# Patient Record
Sex: MALE | Race: BLACK OR AFRICAN AMERICAN | NOT HISPANIC OR LATINO | Employment: OTHER | URBAN - METROPOLITAN AREA
[De-identification: names, ages, dates, MRNs, and addresses within clinical notes are randomized per-mention and may not be internally consistent; named-entity substitution may affect disease eponyms.]

---

## 2017-11-09 ENCOUNTER — HOSPITAL ENCOUNTER (INPATIENT)
Facility: HOSPITAL | Age: 82
LOS: 3 days | Discharge: HOME | DRG: 988 | End: 2017-11-13
Attending: EMERGENCY MEDICINE
Payer: MEDICARE

## 2017-11-09 DIAGNOSIS — L03.031 CELLULITIS OF RIGHT TOE: Primary | ICD-10-CM

## 2017-11-09 DIAGNOSIS — Z89.619: ICD-10-CM

## 2017-11-09 DIAGNOSIS — I10 HYPERTENSION, UNSPECIFIED TYPE: ICD-10-CM

## 2017-11-09 DIAGNOSIS — I73.9 PERIPHERAL ARTERIAL DISEASE: ICD-10-CM

## 2017-11-09 DIAGNOSIS — M86.9 OSTEOMYELITIS: ICD-10-CM

## 2017-11-09 DIAGNOSIS — E11.22 CONTROLLED TYPE 2 DIABETES MELLITUS WITH STAGE 3 CHRONIC KIDNEY DISEASE, UNSPECIFIED LONG TERM INSULIN USE STATUS: ICD-10-CM

## 2017-11-09 DIAGNOSIS — N18.3 CONTROLLED TYPE 2 DIABETES MELLITUS WITH STAGE 3 CHRONIC KIDNEY DISEASE, UNSPECIFIED LONG TERM INSULIN USE STATUS: ICD-10-CM

## 2017-11-09 DIAGNOSIS — K21.9 GASTROESOPHAGEAL REFLUX DISEASE, ESOPHAGITIS PRESENCE NOT SPECIFIED: ICD-10-CM

## 2017-11-09 LAB
ALBUMIN SERPL BCP-MCNC: 2.9 G/DL
ALP SERPL-CCNC: 105 U/L
ALT SERPL W/O P-5'-P-CCNC: 8 U/L
ANION GAP SERPL CALC-SCNC: 10 MMOL/L
AST SERPL-CCNC: 12 U/L
BASOPHILS # BLD AUTO: 0 K/UL
BASOPHILS NFR BLD: 0.8 %
BILIRUB SERPL-MCNC: 0.2 MG/DL
BUN SERPL-MCNC: 23 MG/DL
CALCIUM SERPL-MCNC: 8.9 MG/DL
CHLORIDE SERPL-SCNC: 101 MMOL/L
CO2 SERPL-SCNC: 26 MMOL/L
CREAT SERPL-MCNC: 1.5 MG/DL
CRP SERPL-MCNC: 18.5 MG/L
DIFFERENTIAL METHOD: ABNORMAL
EOSINOPHIL # BLD AUTO: 0.1 K/UL
EOSINOPHIL NFR BLD: 2.3 %
ERYTHROCYTE [DISTWIDTH] IN BLOOD BY AUTOMATED COUNT: 14.6 %
ERYTHROCYTE [SEDIMENTATION RATE] IN BLOOD BY WESTERGREN METHOD: 55 MM/HR
EST. GFR  (AFRICAN AMERICAN): 46 ML/MIN/1.73 M^2
EST. GFR  (NON AFRICAN AMERICAN): 40 ML/MIN/1.73 M^2
GLUCOSE SERPL-MCNC: 115 MG/DL
HCT VFR BLD AUTO: 24.7 %
HGB BLD-MCNC: 8.1 G/DL
LYMPHOCYTES # BLD AUTO: 0.8 K/UL
LYMPHOCYTES NFR BLD: 17.1 %
MCH RBC QN AUTO: 27.5 PG
MCHC RBC AUTO-ENTMCNC: 32.6 G/DL
MCV RBC AUTO: 84 FL
MONOCYTES # BLD AUTO: 0.4 K/UL
MONOCYTES NFR BLD: 8.9 %
NEUTROPHILS # BLD AUTO: 3.5 K/UL
NEUTROPHILS NFR BLD: 70.9 %
PLATELET # BLD AUTO: 220 K/UL
PMV BLD AUTO: 8.9 FL
POTASSIUM SERPL-SCNC: 3.8 MMOL/L
PROT SERPL-MCNC: 7.1 G/DL
RBC # BLD AUTO: 2.93 M/UL
SODIUM SERPL-SCNC: 137 MMOL/L
WBC # BLD AUTO: 4.9 K/UL

## 2017-11-09 PROCEDURE — G0378 HOSPITAL OBSERVATION PER HR: HCPCS

## 2017-11-09 PROCEDURE — 99284 EMERGENCY DEPT VISIT MOD MDM: CPT | Mod: 25

## 2017-11-09 PROCEDURE — 12000002 HC ACUTE/MED SURGE SEMI-PRIVATE ROOM

## 2017-11-09 PROCEDURE — 96367 TX/PROPH/DG ADDL SEQ IV INF: CPT

## 2017-11-09 PROCEDURE — 25000003 PHARM REV CODE 250: Performed by: EMERGENCY MEDICINE

## 2017-11-09 PROCEDURE — 85651 RBC SED RATE NONAUTOMATED: CPT

## 2017-11-09 PROCEDURE — 86140 C-REACTIVE PROTEIN: CPT

## 2017-11-09 PROCEDURE — 63600175 PHARM REV CODE 636 W HCPCS: Performed by: INTERNAL MEDICINE

## 2017-11-09 PROCEDURE — 36415 COLL VENOUS BLD VENIPUNCTURE: CPT

## 2017-11-09 PROCEDURE — 80053 COMPREHEN METABOLIC PANEL: CPT

## 2017-11-09 PROCEDURE — 85025 COMPLETE CBC W/AUTO DIFF WBC: CPT

## 2017-11-09 PROCEDURE — 99222 1ST HOSP IP/OBS MODERATE 55: CPT | Mod: ,,, | Performed by: INTERNAL MEDICINE

## 2017-11-09 PROCEDURE — 96365 THER/PROPH/DIAG IV INF INIT: CPT

## 2017-11-09 PROCEDURE — 63600175 PHARM REV CODE 636 W HCPCS: Performed by: EMERGENCY MEDICINE

## 2017-11-09 RX ORDER — HYDROCHLOROTHIAZIDE 50 MG/1
25 TABLET ORAL DAILY
COMMUNITY

## 2017-11-09 RX ORDER — PANTOPRAZOLE SODIUM 40 MG/1
40 TABLET, DELAYED RELEASE ORAL DAILY
Status: DISCONTINUED | OUTPATIENT
Start: 2017-11-10 | End: 2017-11-13 | Stop reason: HOSPADM

## 2017-11-09 RX ORDER — ATORVASTATIN CALCIUM 40 MG/1
20 TABLET, FILM COATED ORAL DAILY
COMMUNITY

## 2017-11-09 RX ORDER — ATORVASTATIN CALCIUM 20 MG/1
20 TABLET, FILM COATED ORAL DAILY
Status: DISCONTINUED | OUTPATIENT
Start: 2017-11-10 | End: 2017-11-13 | Stop reason: HOSPADM

## 2017-11-09 RX ORDER — ONDANSETRON 2 MG/ML
4 INJECTION INTRAMUSCULAR; INTRAVENOUS EVERY 8 HOURS PRN
Status: DISCONTINUED | OUTPATIENT
Start: 2017-11-09 | End: 2017-11-13 | Stop reason: HOSPADM

## 2017-11-09 RX ORDER — OMEPRAZOLE 20 MG/1
20 TABLET, DELAYED RELEASE ORAL DAILY
COMMUNITY

## 2017-11-09 RX ORDER — DOCUSATE SODIUM 100 MG/1
100 CAPSULE, LIQUID FILLED ORAL NIGHTLY PRN
COMMUNITY

## 2017-11-09 RX ORDER — HYDROCHLOROTHIAZIDE 25 MG/1
25 TABLET ORAL DAILY
Status: DISCONTINUED | OUTPATIENT
Start: 2017-11-10 | End: 2017-11-13 | Stop reason: HOSPADM

## 2017-11-09 RX ORDER — LISINOPRIL 40 MG/1
40 TABLET ORAL DAILY
Status: DISCONTINUED | OUTPATIENT
Start: 2017-11-10 | End: 2017-11-13 | Stop reason: HOSPADM

## 2017-11-09 RX ORDER — ACETAMINOPHEN 325 MG/1
650 TABLET ORAL EVERY 6 HOURS PRN
Status: DISCONTINUED | OUTPATIENT
Start: 2017-11-09 | End: 2017-11-13 | Stop reason: HOSPADM

## 2017-11-09 RX ORDER — AMOXICILLIN 250 MG
1 CAPSULE ORAL DAILY PRN
Status: DISCONTINUED | OUTPATIENT
Start: 2017-11-09 | End: 2017-11-09 | Stop reason: SDUPTHER

## 2017-11-09 RX ORDER — HEPARIN SODIUM 5000 [USP'U]/ML
5000 INJECTION, SOLUTION INTRAVENOUS; SUBCUTANEOUS EVERY 12 HOURS
Status: DISCONTINUED | OUTPATIENT
Start: 2017-11-09 | End: 2017-11-13 | Stop reason: HOSPADM

## 2017-11-09 RX ORDER — ASPIRIN 81 MG/1
81 TABLET ORAL DAILY
Status: DISCONTINUED | OUTPATIENT
Start: 2017-11-10 | End: 2017-11-13 | Stop reason: HOSPADM

## 2017-11-09 RX ORDER — DOCUSATE SODIUM 100 MG/1
100 CAPSULE, LIQUID FILLED ORAL NIGHTLY PRN
Status: DISCONTINUED | OUTPATIENT
Start: 2017-11-09 | End: 2017-11-13 | Stop reason: HOSPADM

## 2017-11-09 RX ADMIN — PIPERACILLIN SODIUM,TAZOBACTAM SODIUM 4.5 G: 4; .5 INJECTION, POWDER, FOR SOLUTION INTRAVENOUS at 06:11

## 2017-11-09 RX ADMIN — HEPARIN SODIUM 5000 UNITS: 5000 INJECTION, SOLUTION INTRAVENOUS; SUBCUTANEOUS at 10:11

## 2017-11-09 RX ADMIN — VANCOMYCIN HYDROCHLORIDE 1000 MG: 1 INJECTION, POWDER, LYOPHILIZED, FOR SOLUTION INTRAVENOUS at 07:11

## 2017-11-09 NOTE — ED NOTES
Pt wheeled to room 8 via w/c with family. [facility] infected [facility] great [facility]. Pt denies any other pain or complaints. States toe has been hurting for about 4 weeks. Right great toe swollen, painful with pus drainage noted. Family at bedside. Resp even and unlabored. Pt has previous left aka.

## 2017-11-09 NOTE — ED PROVIDER NOTES
"Encounter Date: 11/9/2017    SCRIBE #1 NOTE: I, Domonique Alejandre, am scribing for, and in the presence of,  Dr. Robin. I have scribed the entire note.       History     Chief Complaint   Patient presents with    Wound Infection       11/09/2017 4:09 PM     Chief complaint: Possible right big toe infection       Michael Shin is a 89 y.o. male with a history of stroke, HTN, CAD, chronic kidney disease, glaucoma, prostate cancer, anemia, and hearing loss who presents to the ED with concern for a possible infection on his right big toe.  Earlier today, the patient was at the [facility] where he was told to come to the ED because "they think his toe might be infected." Patient reports that his "toe has been bugging me on and off for 3-4 weeks." He denies having a fever or vomiting. The patient does not endorse having a history of DM. He has not taken any antibiotics for his toe. His PSHx includes a left AKA after a foot infection, coronary angioplasty, and cataract extraction with intraocular lens implant.       The history is provided by the patient and a relative.     Review of patient's allergies indicates:  No Known Allergies  Past Medical History:   Diagnosis Date    Anemia     CAD (coronary artery disease) 6/21/2013    Cataract     os    CKD (chronic kidney disease) stage 3, GFR 30-59 ml/min 3/27/2014    Glaucoma (increased eye pressure) 6/17/2013    HEARING LOSS     Hypertension     Prostate cancer     Stroke      Past Surgical History:   Procedure Laterality Date    CATARACT EXTRACTION W/  INTRAOCULAR LENS IMPLANT  2006    os    CORONARY ANGIOPLASTY  1993    LEG AMPUTATION THROUGH KNEE       Family History   Problem Relation Age of Onset    Stroke Father      Social History   Substance Use Topics    Smoking status: Former Smoker     Packs/day: 1.00     Years: 50.00     Types: Cigarettes, Cigars     Quit date: 2/6/1980    Smokeless tobacco: Never Used    Alcohol use No     Review of Systems "   Constitutional: Negative for fever.   Musculoskeletal: Positive for arthralgias. Negative for back pain.   Skin: Positive for wound.        r toe       Physical Exam     Initial Vitals [11/09/17 1601]   BP Pulse Resp Temp SpO2   (!) 165/93 77 16 97.9 °F (36.6 °C) 100 %      MAP       117         Physical Exam    Nursing note and vitals reviewed.  Constitutional: He appears well-developed and well-nourished. He is not diaphoretic.  Non-toxic appearance. He does not have a sickly appearance. He does not appear ill. No distress.   HENT:   Head: Normocephalic and atraumatic.   Eyes: EOM are normal.   Neck: Normal range of motion. Neck supple. Normal range of motion present. No neck rigidity.   Cardiovascular: Normal rate, regular rhythm and intact distal pulses. Exam reveals no gallop and no friction rub.    Pulmonary/Chest: Breath sounds normal. No respiratory distress. He has no wheezes. He has no rhonchi. He has no rales.   Musculoskeletal: Normal range of motion.        Right foot: Right great toe: Exhibits tenderness (Lateral nail fold of right big toe; superior drainage with erythema and tenderness).   Left AKA.    Neurological: He is alert and oriented to person, place, and time.   Skin: Skin is warm and dry. No rash noted.   Psychiatric: He has a normal mood and affect. His behavior is normal. Judgment and thought content normal.         ED Course   Procedures  Labs Reviewed   SEDIMENTATION RATE, MANUAL   C-REACTIVE PROTEIN        Imaging Results          X-Ray Toe 2 or More Views Right (In process)  Result time 11/09/17 16:36:14                   Medical Decision Making:   History:   Old Medical Records: I decided to obtain old medical records.  Clinical Tests:   Lab Tests: Reviewed and Ordered  Radiological Study: Reviewed and Ordered            Scribe Attestation:   Scribe #1: I performed the above scribed service and the documentation accurately describes the services I performed. I attest to the accuracy  of the note.    I, Dr. Robin, personally performed the services described in this documentation. All medical record entries made by the scribe were at my direction and in my presence.  I have reviewed the chart and agree that the record reflects my personal performance and is accurate and complete.6:26 PM 11/09/2017            ED Course as of Nov 09 1824   Thu Nov 09, 2017   1815 89-year-old male sent to the emergency room to rule out toe osteomyelitis.  Sedimentation rate elevated at 55.  X-ray does not demonstrate any osteomyelitis but it is possible that it is early.  Case discussed with Dr. Chacon of \[facility]\"" medicine who will admit the patient.  IV Zosyn and IV vancomycin will be given in the emergency room.  Otherwise well-appearing with no sign of sepsis.  [EF]      ED Course User Index  [EF] Joe Robin MD     Clinical Impression:   No diagnosis found.                                     Joe Robin MD  11/09/17 1822

## 2017-11-10 ENCOUNTER — ANESTHESIA EVENT (OUTPATIENT)
Dept: SURGERY | Facility: HOSPITAL | Age: 82
DRG: 988 | End: 2017-11-10
Payer: MEDICARE

## 2017-11-10 ENCOUNTER — ANESTHESIA (OUTPATIENT)
Dept: SURGERY | Facility: HOSPITAL | Age: 82
DRG: 988 | End: 2017-11-10
Payer: MEDICARE

## 2017-11-10 PROBLEM — E44.0 MALNUTRITION OF MODERATE DEGREE: Status: ACTIVE | Noted: 2017-11-10

## 2017-11-10 LAB
ANION GAP SERPL CALC-SCNC: 9 MMOL/L
BASOPHILS # BLD AUTO: 0.1 K/UL
BASOPHILS NFR BLD: 1.2 %
BUN SERPL-MCNC: 22 MG/DL
CALCIUM SERPL-MCNC: 9.3 MG/DL
CHLORIDE SERPL-SCNC: 101 MMOL/L
CO2 SERPL-SCNC: 28 MMOL/L
CREAT SERPL-MCNC: 1.6 MG/DL
DIFFERENTIAL METHOD: ABNORMAL
EOSINOPHIL # BLD AUTO: 0.3 K/UL
EOSINOPHIL NFR BLD: 5.3 %
ERYTHROCYTE [DISTWIDTH] IN BLOOD BY AUTOMATED COUNT: 14.6 %
EST. GFR  (AFRICAN AMERICAN): 43 ML/MIN/1.73 M^2
EST. GFR  (NON AFRICAN AMERICAN): 37 ML/MIN/1.73 M^2
FOLATE SERPL-MCNC: 13.7 NG/ML
GLUCOSE SERPL-MCNC: 105 MG/DL
HCT VFR BLD AUTO: 25.3 %
HGB BLD-MCNC: 8.2 G/DL
IRON SERPL-MCNC: 29 UG/DL
LYMPHOCYTES # BLD AUTO: 1.2 K/UL
LYMPHOCYTES NFR BLD: 22.4 %
MCH RBC QN AUTO: 27.5 PG
MCHC RBC AUTO-ENTMCNC: 32.5 G/DL
MCV RBC AUTO: 84 FL
MONOCYTES # BLD AUTO: 0.4 K/UL
MONOCYTES NFR BLD: 7.5 %
NEUTROPHILS # BLD AUTO: 3.3 K/UL
NEUTROPHILS NFR BLD: 63.6 %
PLATELET # BLD AUTO: 181 K/UL
PMV BLD AUTO: 9.8 FL
POTASSIUM SERPL-SCNC: 4.2 MMOL/L
RBC # BLD AUTO: 3 M/UL
SATURATED IRON: 15 %
SODIUM SERPL-SCNC: 138 MMOL/L
TOTAL IRON BINDING CAPACITY: 188 UG/DL
TRANSFERRIN SERPL-MCNC: 127 MG/DL
VIT B12 SERPL-MCNC: 206 PG/ML
WBC # BLD AUTO: 5.2 K/UL

## 2017-11-10 PROCEDURE — 25000003 PHARM REV CODE 250: Performed by: PODIATRIST

## 2017-11-10 PROCEDURE — 37000009 HC ANESTHESIA EA ADD 15 MINS: Performed by: PODIATRIST

## 2017-11-10 PROCEDURE — 25000003 PHARM REV CODE 250: Performed by: EMERGENCY MEDICINE

## 2017-11-10 PROCEDURE — 82607 VITAMIN B-12: CPT

## 2017-11-10 PROCEDURE — 63600175 PHARM REV CODE 636 W HCPCS: Performed by: INTERNAL MEDICINE

## 2017-11-10 PROCEDURE — D9220A PRA ANESTHESIA: Mod: CRNA,,, | Performed by: NURSE ANESTHETIST, CERTIFIED REGISTERED

## 2017-11-10 PROCEDURE — 97802 MEDICAL NUTRITION INDIV IN: CPT

## 2017-11-10 PROCEDURE — 85025 COMPLETE CBC W/AUTO DIFF WBC: CPT

## 2017-11-10 PROCEDURE — 82746 ASSAY OF FOLIC ACID SERUM: CPT

## 2017-11-10 PROCEDURE — 36415 COLL VENOUS BLD VENIPUNCTURE: CPT

## 2017-11-10 PROCEDURE — 0HBRXZZ EXCISION OF TOE NAIL, EXTERNAL APPROACH: ICD-10-PCS | Performed by: PODIATRIST

## 2017-11-10 PROCEDURE — G0378 HOSPITAL OBSERVATION PER HR: HCPCS

## 2017-11-10 PROCEDURE — 87070 CULTURE OTHR SPECIMN AEROBIC: CPT

## 2017-11-10 PROCEDURE — 83540 ASSAY OF IRON: CPT

## 2017-11-10 PROCEDURE — 87015 SPECIMEN INFECT AGNT CONCNTJ: CPT | Mod: 59

## 2017-11-10 PROCEDURE — 71000033 HC RECOVERY, INTIAL HOUR: Performed by: PODIATRIST

## 2017-11-10 PROCEDURE — 99232 SBSQ HOSP IP/OBS MODERATE 35: CPT | Mod: ,,, | Performed by: INTERNAL MEDICINE

## 2017-11-10 PROCEDURE — 99900103 DSU ONLY-NO CHARGE-INITIAL HR (STAT): Performed by: PODIATRIST

## 2017-11-10 PROCEDURE — 36000706: Performed by: PODIATRIST

## 2017-11-10 PROCEDURE — 25000003 PHARM REV CODE 250: Performed by: NURSE ANESTHETIST, CERTIFIED REGISTERED

## 2017-11-10 PROCEDURE — 87075 CULTR BACTERIA EXCEPT BLOOD: CPT

## 2017-11-10 PROCEDURE — 37000008 HC ANESTHESIA 1ST 15 MINUTES: Performed by: PODIATRIST

## 2017-11-10 PROCEDURE — D9220A PRA ANESTHESIA: Mod: ANES,,, | Performed by: ANESTHESIOLOGY

## 2017-11-10 PROCEDURE — 12000002 HC ACUTE/MED SURGE SEMI-PRIVATE ROOM

## 2017-11-10 PROCEDURE — 0QBQ3ZX EXCISION OF RIGHT TOE PHALANX, PERCUTANEOUS APPROACH, DIAGNOSTIC: ICD-10-PCS | Performed by: PODIATRIST

## 2017-11-10 PROCEDURE — 80048 BASIC METABOLIC PNL TOTAL CA: CPT

## 2017-11-10 PROCEDURE — 25000003 PHARM REV CODE 250: Performed by: INTERNAL MEDICINE

## 2017-11-10 PROCEDURE — 36000707: Performed by: PODIATRIST

## 2017-11-10 PROCEDURE — 87116 MYCOBACTERIA CULTURE: CPT

## 2017-11-10 RX ORDER — SODIUM CHLORIDE, SODIUM LACTATE, POTASSIUM CHLORIDE, CALCIUM CHLORIDE 600; 310; 30; 20 MG/100ML; MG/100ML; MG/100ML; MG/100ML
INJECTION, SOLUTION INTRAVENOUS CONTINUOUS PRN
Status: DISCONTINUED | OUTPATIENT
Start: 2017-11-10 | End: 2017-11-10

## 2017-11-10 RX ORDER — SODIUM CHLORIDE, SODIUM LACTATE, POTASSIUM CHLORIDE, CALCIUM CHLORIDE 600; 310; 30; 20 MG/100ML; MG/100ML; MG/100ML; MG/100ML
1000 INJECTION, SOLUTION INTRAVENOUS ONCE
Status: DISCONTINUED | OUTPATIENT
Start: 2017-11-10 | End: 2017-11-11

## 2017-11-10 RX ORDER — FENTANYL CITRATE 50 UG/ML
25 INJECTION, SOLUTION INTRAMUSCULAR; INTRAVENOUS EVERY 5 MIN PRN
Status: DISCONTINUED | OUTPATIENT
Start: 2017-11-10 | End: 2017-11-13 | Stop reason: HOSPADM

## 2017-11-10 RX ORDER — LIDOCAINE HYDROCHLORIDE 10 MG/ML
INJECTION, SOLUTION EPIDURAL; INFILTRATION; INTRACAUDAL; PERINEURAL
Status: DISCONTINUED | OUTPATIENT
Start: 2017-11-10 | End: 2017-11-10 | Stop reason: HOSPADM

## 2017-11-10 RX ADMIN — LISINOPRIL 40 MG: 40 TABLET ORAL at 09:11

## 2017-11-10 RX ADMIN — PIPERACILLIN AND TAZOBACTAM 4.5 G: 4; .5 INJECTION, POWDER, LYOPHILIZED, FOR SOLUTION INTRAVENOUS; PARENTERAL at 11:11

## 2017-11-10 RX ADMIN — HEPARIN SODIUM 5000 UNITS: 5000 INJECTION, SOLUTION INTRAVENOUS; SUBCUTANEOUS at 09:11

## 2017-11-10 RX ADMIN — ASPIRIN 81 MG: 81 TABLET, COATED ORAL at 09:11

## 2017-11-10 RX ADMIN — HYDROCHLOROTHIAZIDE 25 MG: 25 TABLET ORAL at 09:11

## 2017-11-10 RX ADMIN — SODIUM CHLORIDE, SODIUM LACTATE, POTASSIUM CHLORIDE, AND CALCIUM CHLORIDE: .6; .31; .03; .02 INJECTION, SOLUTION INTRAVENOUS at 06:11

## 2017-11-10 RX ADMIN — PIPERACILLIN AND TAZOBACTAM 4.5 G: 4; .5 INJECTION, POWDER, LYOPHILIZED, FOR SOLUTION INTRAVENOUS; PARENTERAL at 03:11

## 2017-11-10 RX ADMIN — PANTOPRAZOLE SODIUM 40 MG: 40 TABLET, DELAYED RELEASE ORAL at 09:11

## 2017-11-10 RX ADMIN — ATORVASTATIN CALCIUM 20 MG: 20 TABLET, FILM COATED ORAL at 09:11

## 2017-11-10 RX ADMIN — PIPERACILLIN SODIUM AND TAZOBACTAM SODIUM 4.5 G: 3; .375 INJECTION, POWDER, LYOPHILIZED, FOR SOLUTION INTRAVENOUS at 06:11

## 2017-11-10 NOTE — PLAN OF CARE
Problem: Patient Care Overview  Goal: Plan of Care Review  Outcome: Ongoing (interventions implemented as appropriate)  Pt free of injury, able to call for assistance when needed, vital signs stable. Iv antibiotics given per orders. No complaints of pain. Podiatry and Infectious disease consults ordered. Vital signs stable. Will continue to monitor pt.

## 2017-11-10 NOTE — PLAN OF CARE
Problem: Patient Care Overview  Goal: Plan of Care Review  Plan of care reviewed with pt during admission.  Pt verbalized understanding. Hourly/ Q2 hourly rounds completed on this pt throughout shift.  Pt denied need for pain medication throughout shift, wears brief but has been using urinal to void as usual. Repositions self safety maintained.  Patient has remained free from fall/injury, no skin breakdown noted.  Side rails up x2, bed in locked and lowest position, call light kept within reach.  Needs attended to, will continue to monitor/ update as indicated

## 2017-11-10 NOTE — PLAN OF CARE
1743- pt rransferrred from room 303B via bed, pt had left AKA. 20 jelco in left AC fluids running at KVO, pt in diapers. [place], blind in left eye. Answers questions approp. Dr burleson at bedsided got anes. Consent signed. Waiting for dr orona to come see pt.

## 2017-11-10 NOTE — PLAN OF CARE
The pt lives at home with his daughter, Tanya Shin [phone]. [facility]. The pt stated that he lives at home with his daughter and uses a wheelchair. He denies coumadin or dialysis. He denies HH . He has [employer] and [facility] insurance. No questions or concerns at this time. Nora Hernández LMSW      11/10/17 0939   Discharge Assessment   Assessment Type Discharge Planning Assessment   Confirmed/corrected address and phone number on facesheet? Yes   Assessment information obtained from? Patient   Communicated expected length of stay with patient/caregiver no   Prior to hospitilization cognitive status: Alert/Oriented   Prior to hospitalization functional status: Assistive Equipment   Current cognitive status: Alert/Oriented   Current Functional Status: Assistive Equipment   Lives With child(zena), adult  (Tanya Shin [phone])   Able to Return to Prior Arrangements yes   Is patient able to care for self after discharge? Unable to determine at this time (comments)   Readmission Within The Last 30 Days no previous admission in last 30 days   Patient currently being followed by outpatient case management? No   Patient currently receives any other outside agency services? No   Equipment Currently Used at Home cane, quad;wheelchair;shower chair   Do you have any problems affording any of your prescribed medications? No   Is the patient taking medications as prescribed? yes   Does the patient have transportation home? Yes   Transportation Available family or friend will provide   Does the patient receive services at the Coumadin Clinic? No   Discharge Plan A Home   Discharge Plan B Home with family   Patient/Family In Agreement With Plan yes

## 2017-11-10 NOTE — PROGRESS NOTES
"Progress Note  Hospital Medicine  Patient Name:Michael Shin  MRN:  [id number]  Patient Class: IP- Inpatient  Admit Date: 11/9/2017  Length of Stay: 1 days  Expected Discharge Date:   Attending Physician: Russell Chacon MD  Primary Care Provider:  Primary Doctor No    SUBJECTIVE:     Principal Problem: Right great toe infection  Initial history of present illness: Patient is a 89 y.o. male admitted to Hospitalist Service from [facility] Room with complaint of right great toe infection. Patient reportedly has past medical history significant for chronic anemia, CKD -3, hypertension, history or prostate cancer, PAD s/p left AKA and CAD. Patient was sent from [facility] for concern for right great toe infection. Patient is a poor historian. Patient reports that his "toe has been bugging me on and off for 3-4 weeks." He denies having a fever or vomiting. The patient does not endorse having a history of DM. He has not taken any antibiotics for his toe. Patient denied chest pain, shortness of breath, abdominal pain, nausea, vomiting, headache, vision changes, focal neuro-deficits, cough or fever.    PMH/PSH/SH/FH/Meds: reviewed.    Symptoms/Review of Systems:  Patient reports continued pain to foot. No shortness of breath, cough, chest pain or headache, fever or abdominal pain.     Diet:  Adequate intake.    Activity level: Normal.    Pain:  Patient reports no pain.       OBJECTIVE:   Vital Signs (Most Recent):      Temp: 99 °F (37.2 °C) (11/10/17 0445)  Pulse: 63 (11/10/17 0445)  Resp: 18 (11/10/17 0445)  BP: (!) 145/63 (11/10/17 0445)  SpO2: 100 % (11/10/17 0445)       Vital Signs Range (Last 24H):  Temp:  [97.9 °F (36.6 °C)-99 °F (37.2 °C)]   Pulse:  [61-77]   Resp:  [16-20]   BP: (145-182)/(63-93)   SpO2:  [91 %-100 %]     Weight: 48.5 kg (107 lb)  Body mass index is 17.27 kg/m².    Intake/Output Summary (Last 24 hours) at 11/10/17 2084  Last data filed at 11/10/17 0600   Gross per 24 hour "   Intake              100 ml   Output              700 ml   Net             -600 ml     Physical Examination:  General appearance: well developed, appears stated age  Head: normocephalic, atraumatic  Eyes:  conjunctivae/corneas clear. PERRL.  Nose: Nares normal. Septum midline.  Throat: lips, mucosa, and tongue normal; teeth and gums normal, no throat erythema.  Neck: supple, symmetrical, trachea midline, no JVD and thyroid not enlarged, symmetric, no tenderness/mass/nodules  Lungs:  clear to auscultation bilaterally and normal respiratory effort  Chest wall: no tenderness  Heart: regular rate and rhythm, S1, S2 normal, no murmur, click, rub or gallop  Abdomen: soft, non-tender non-distented; bowel sounds normal; no masses,  no organomegaly  Extremities: no cyanosis, clubbing or edema. Right great toe is warm, tender and slightly swollen . Lateral nail fold of right big toe; superior drainage with erythema and tenderness. Left AKA.    Pulses: 2+ and symmetric  Skin: Skin color, texture, turgor normal. No rashes or lesions.  Lymph nodes: Cervical, supraclavicular, and axillary nodes normal.  Neurologic: Normal strength and tone. No focal numbness or weakness. CNII-XII intact.      CBC:    Recent Labs  Lab 11/09/17  1828 11/10/17  0517   WBC 4.90 5.20   RBC 2.93* 3.00*   HGB 8.1* 8.2*   HCT 24.7* 25.3*    181   MCV 84 84   MCH 27.5 27.5   MCHC 32.6 32.5   BMP    Recent Labs  Lab 11/09/17  1828 11/10/17  0517   * 105    138   K 3.8 4.2    101   CO2 26 28   BUN 23 22   CREATININE 1.5* 1.6*   CALCIUM 8.9 9.3      Diagnostic Results:  Microbiology Results (last 7 days)     ** No results found for the last 168 hours. 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 Osteomyelitis or fracture is not identified.    MRI right foot:  1.  Features of acute osteomyelitis involving the distal end of the  right first distal phalanx.    2. Soft tissue edema elsewhere in the right forefoot most notably involving the right great toe, consistent with cellulitis and/or dependent edema. No focal fluid collection to suggest abscess is identified.  Assessment/Plan:      Cellulitis of right toe osteomyelitis [L03.031]  Keep extremity elevated.   Continue IV Clindamycin and Vancomycin. Vancomycin doing per pharmacy.  MRI of right foot reviewed.  CRP and ESR reviewed.  Consult podiatrist,. Check US LE arterial.      Yes    CKD (chronic kidney disease) stage 3, GFR 30-59 ml/min [N18.3]  Monitor BUN/SCr.  Monitor I/Os.  Monitor electrolytes.   Yes    Status post unilateral above knee amputation [Z89.619]  Supportive care.      Not Applicable    Type 2 diabetes mellitus with renal manifestations, controlled [E11.29]  Check blood glucose level q AC/HS.  Use Novolog Insulin Sliding Scale as needed.   Continue American Diabetic Association 1800 Kcal diet.   Yes    GERD (gastroesophageal reflux disease) [K21.9]  Continue PPI.      Yes    Hypertension [I10]  Chronic problem. Will continue chronic medications and monitor for any changes, adjusting as needed.   Yes    Peripheral arterial disease [I73.9]  Chronic problem. Will continue chronic medications and monitor for any changes, adjusting as needed.   Yes       DVT prophylaxis: Heparin 5K sq q 12 hrs.      VTE Risk Mitigation         Ordered     heparin (porcine) injection 5,000 Units  Every 12 hours     Route:  Subcutaneous        11/09/17 1955     Medium Risk of VTE  Once      11/09/17 1955     Place sequential compression device  Until discontinued      11/09/17 1955     Place JULIAN hose  Until discontinued      11/09/17 1955        Russell Chacon MD  Department of Hospital Medicine   [facility]

## 2017-11-10 NOTE — ASSESSMENT & PLAN NOTE
Related to (etiology):   Chronic illness    Signs and Symptoms (as evidenced by):   1) Underweight with adjusted BMI of 19 for AKA. 2) wound on rt great toe    Interventions/Recommendations (treatment strategy):  Continue low sodium diet and add ONS    Nutrition Diagnosis Status:   New

## 2017-11-10 NOTE — ASSESSMENT & PLAN NOTE
Related to (etiology):   Chronic illness    Signs and Symptoms (as evidenced by):   1) Underweight with adjusted BMI of 19 for AKA. 2) wound on rt great toe 3) Muscle loss with protruding clavicle 4) Fat loss noted with hollow bilateral orbital    Interventions/Recommendations (treatment strategy):  Continue low sodium diet and add ONS    Nutrition Diagnosis Status:   New

## 2017-11-10 NOTE — PROGRESS NOTES
[facility]  Adult Nutrition  Progress Note    SUMMARY     Recommendations    Recommendation/Intervention: 1) Continue low sodium diet 2) Add Boost Plus tid  Goals: 1) Meal intake at least 50% 2) Use of ONS during admit  Nutrition Goal Status: new  Communication of RD Recs:  (care plan, second signl)    Discharge Plan     low sodium diet    Reason for Assessment    Reason for Assessment: length of stay      1. Cellulitis of right toe    2. Gastroesophageal reflux disease, esophagitis presence not specified    3. Hypertension, unspecified type    4. Status post unilateral above knee amputation    5. Controlled type 2 diabetes mellitus with stage 3 chronic kidney disease, unspecified long term insulin use status      Past Medical History:   Diagnosis Date    Anemia     CAD (coronary artery disease) 6/21/2013    Cataract     os    CKD (chronic kidney disease) stage 3, GFR 30-59 ml/min 3/27/2014    Glaucoma (increased eye pressure) 6/17/2013    HEARING LOSS     Hypertension     Prostate cancer     Stroke       Interdisciplinary Rounds: did not attend     General Information Comments: [facility] Pt is [place]. Pt reports good appetite with meal intake of 100%; however, nursing records show 25% intake. Pt appears underweight, cachetic. Pt unable to say type of home diet he follows and notes that his daughter cooks for him.   (Pt reports UBW of 145 lbs prior to amputation leg. )    Nutrition Prescription Ordered    Current Diet Order: low sodium     Evaluation of Received Nutrients/Fluid Intake      Energy Calories Required: not meeting needs      Protein Required: not meeting needs    Intake/Output Summary (Last 24 hours) at 11/10/17 1459  Last data filed at 11/10/17 0600   Gross per 24 hour   Intake              100 ml   Output              700 ml   Net             -600 ml        Fluid Required: not meeting needs  Comments: Recent admit, only one meal to  "evaluate.      % Intake of Estimated Energy Needs: 0 - 25 %  % Meal Intake: 25%     Nutrition Risk Screen     Nutrition Risk Screen: large or nonhealing wound, burn or pressure ulcer    Nutrition/Diet History     Food Preferences: No cultural or religous food preferences identified.         Labs/Tests/Procedures/Meds    Diagnostic Test/Procedure Review: reviewed, pertinent  Pertinent Labs Reviewed: reviewed, pertinent  BMP  Lab Results   Component Value Date     11/10/2017    K 4.2 11/10/2017     11/10/2017    CO2 28 11/10/2017    BUN 22 11/10/2017    CREATININE 1.6 (H) 11/10/2017    CALCIUM 9.3 11/10/2017    ANIONGAP 9 11/10/2017    ESTGFRAFRICA 43 (A) 11/10/2017    EGFRNONAA 37 (A) 11/10/2017     Lab Results   Component Value Date    CALCIUM 9.3 11/10/2017    PHOS 3.5 2013     No results for input(s): POCTGLUCOSE in the last 24 hours.  Lab Results   Component Value Date    HGBA1C 5.9 2013     Lab Results   Component Value Date    ALBUMIN 2.9 (L) 2017     Lab Results   Component Value Date    CRP 18.5 (H) 2017     Pertinent Medications Reviewed: reviewed, pertinent      Scheduled Meds:   aspirin  81 mg Oral Daily    atorvastatin  20 mg Oral Daily    heparin (porcine)  5,000 Units Subcutaneous Q12H    hydroCHLOROthiazide  25 mg Oral Daily    lisinopril  40 mg Oral Daily    pantoprazole  40 mg Oral Daily    piperacillin-tazobactam 4.5 g in dextrose 5 % 100 mL IVPB (ready to mix system)  4.5 g Intravenous Q8H     Continuous Infusions:     Physical Findings    Overall Physical Appearance: underweight     Oral/Mouth Cavity: dental caries  Skin:  (Glen score 17)    Anthropometrics    Temp: 98.1 °F (36.7 °C)  Height Method: Measured  Height: 5' 6"  Weight Method: Bed Scale  Weight: 48.5 kg (106 lb 14.8 oz)  Ideal Body Weight (IBW), Male: 142 lb     % Ideal Body Weight, Male (lb): 75.3 lb     BMI (Calculated): 17.3        Usual Body Weight (UBW), k.2 kg (per chart review " 10/3/14)     % Usual Body Weight: 84.97  % Weight Change From Usual Weight: -15.21 %     Estimated/Assessed Needs    Weight Used For Calorie Calculations: 48.5 kg (107 lbs)   Height (cm): 167.6 cm     Energy Need Method: [employer] [place]-St Jeor     30 kcal/kg (kcal): 1455 and 35 kcal/kg (kcal):1697      Weight Used For Protein Calculations: 48.5 kg (107 lb)     1.2 gm Protein (gm): 58.36 and 1.5 gm Protein (gm): 72.95     Fluid Need Method: RDA Method (or per MD rec)       CHO Requirement: na     Assessment and Plan    Malnutrition of moderate degree    Related to (etiology):   Chronic illness    Signs and Symptoms (as evidenced by):   1) Underweight with adjusted BMI of 19 for AKA. 2) wound on rt great toe  3) Muscle loss with protruding clavicle 4) Fat loss noted bilateral hollow orbital    Interventions/Recommendations (treatment strategy):  Continue low sodium diet and add ONS    Nutrition Diagnosis Status:   New              Monitor and Evaluation    Food and Nutrient Intake: energy intake  Food and Nutrient Adminstration: diet order   Anthropometric Measurements: weight, weight change  Biochemical Data, Medical Tests and Procedures: electrolyte and renal panel, glucose/endocrine profile, inflammatory profile, lipid profile  Nutrition-Focused Physical Findings: overall appearance, skin    Nutrition Risk    Level of Risk:  (2 x weekly)    Nutrition Follow-Up     yes

## 2017-11-10 NOTE — PLAN OF CARE
Problem: Nutrition, Imbalanced: Inadequate Oral Intake (Adult)  Goal: Prevent Further Weight Loss  Patient will demonstrate the desired outcomes by discharge/transition of care.  Recommendation/Intervention: 1) Continue low sodium diet 2) Add Boost Plus tid  Goals: 1) Meal intake at least 50% 2) Use of ONS during admit  Nutrition Goal Status: new  Communication of RD Recs:  (care plan, second signl)

## 2017-11-10 NOTE — CONSULTS
Consult Note  Infectious Disease    Reason for Consult:  Osteomyelitis of the right great toe    HPI: Michael Shin is a pleasant 89 y.o. male [facility]. Plain film did not show any lucencies, patient was admitted and given one dose of vanc and scheduled zosyn. No cultures to obtain. Probably began in a paronychia. Has been uncomfortable now for about 3 weeks. No constitutional symptoms. An MRI was performed which demonstrated osteomyelitis in the distal phalanx of the great toe.     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    Past Surgical History:   Procedure Laterality Date    CATARACT EXTRACTION W/  INTRAOCULAR LENS IMPLANT  2006    os    CORONARY ANGIOPLASTY  1993    LEG AMPUTATION above knee following amputation of toe 2013     Left great toe amputation 2/8/13    Social History     Social History    Marital status:      Spouse name: N/A    Number of children: 4    Years of education: N/A     Social History Main Topics    Smoking status: Former Smoker     Packs/day: 1.00     Years: 50.00     Types: Cigarettes, Cigars     Quit date: 2/6/1980    Smokeless tobacco: Never Used    Alcohol use No    Drug use: No    Sexual activity: Not Asked     Other Topics Concern    None     Social History Narrative    None     Family History   Problem Relation Age of Onset    Stroke Father        Pertinent medications noted:     Review of Systems:   No chills, fever, sweats      EXAM & DIAGNOSTICS REVIEWED:   Vitals:     Temp:  [98 °F (36.7 °C)-99 °F (37.2 °C)]   Temp: 98.1 °F (36.7 °C) (11/10/17  1432)  Pulse: (!) 56 (11/10/17 1126)  Resp: 18 (11/10/17 1126)  BP: (!) 143/64 (11/10/17 1126)  SpO2: 100 % (11/10/17 1126)    Intake/Output Summary (Last 24 hours) at 11/10/17 1646  Last data filed at 11/10/17 0600   Gross per 24 hour   Intake              100 ml   Output              700 ml   Net             -600 ml       General:  In NAD. Looks non toxic. Alert and attentive, cooperative  Eyes:  Anicteric, PERRL, EOMI  ENT:  Mouth w/ pink MMM, no lesions/exudate, poor dentition,   Neck:  Trachea midline, supple, no adenopathy appreciated  Lungs: clear  Heart:  RRR, no gallop/murmur noted  Abd:  soft, NT, ND, normal BS, no masses/organomegaly appreciated.  :  Voids  Musc:  Joints without effusion, swelling,  And has well healed left AKA  Skin:  Generally warm, dry, normal for color. No rashes. No palmar or plantar    lesions. No subungual petechiae  Wound: Right great toe mildly cellulitic. No purulence can be expressed. Dystrophic nail and tender all around the nail bed. No ischemic changes.   Neuro: AAOx3, speech clear, moves all extrems equally  Extrem: No edema, erythema, phlebitis. Right foot is warm but pulses are absent.   VAD:    Lines/Tubes/Drains:    General Labs reviewed:    Recent Labs  Lab 11/10/17  0517   WBC 5.20   RBC 3.00*   HGB 8.2*   HCT 25.3*      MCV 84   MCH 27.5   MCHC 32.5       Recent Labs  Lab 11/09/17  1828 11/10/17  0517   CALCIUM 8.9 9.3   PROT 7.1  --     138   K 3.8 4.2   CO2 26 28    101   BUN 23 22   CREATININE 1.5* 1.6*   ALKPHOS 105  --    ALT 8*  --    AST 12  --    BILITOT 0.2  --        Micro:  Microbiology Results (last 7 days)     ** No results found for the last 168 hours. **        Imaging Reviewed:   Xray and MRIImpression       1.  Features of acute osteomyelitis involving the distal end of the right first distal phalanx.    2. Soft tissue edema elsewhere in the right forefoot most notably involving the right great toe, consistent with cellulitis  and/or dependent edema. No focal fluid collection to suggest abscess is identified.     IMPRESSION & PLAN   1. Osteomyelitis distal phalanx of right great toe, likely originating from a paronychia, nothing to culture externally  2. Severe PAD, not a good candidate for amputation of toe  3. CKD  4.    Recommendation:   If possible, will ask Dr. Lackey to perform needle biopsy of distal phalanx. Antibiotics received thus far may negate the culture  Would not recommend amputation of digit due to vascular disease  Concerned that 6 weeks of broad spectrum antibiotics and picc line would lead to complications in this 89 year old with CKD, therefore considering long term treatment with doxycycline.   Will hold antibiotics in anticipation of biopsy      D/w nursing, daughter and Dr. Lackey

## 2017-11-10 NOTE — H&P
"PCP: Primary Doctor No    History & Physical    Chief Complaint: Right great toe infection    History of Present Illness:  Patient is a 89 y.o. male admitted to Hospitalist Service from [facility] with complaint of right great toe infection. Patient reportedly has past medical history significant for chronic anemia, CKD -3, hypertension, history or prostate cancer, PAD s/p left AKA and CAD. Patient was sent from [facility] for concern for right great toe infection. Patient is a poor historian. Patient reports that his "toe has been bugging me on and off for 3-4 weeks." He denies having a fever or vomiting. The patient does not endorse having a history of DM. He has not taken any antibiotics for his toe. Patient denied chest pain, shortness of breath, abdominal pain, nausea, vomiting, headache, vision changes, focal neuro-deficits, cough or fever.    Past Medical History:   Diagnosis Date    Anemia     CAD (coronary artery disease) 6/21/2013    Cataract     os    CKD (chronic kidney disease) stage 3, GFR 30-59 ml/min 3/27/2014    Glaucoma (increased eye pressure) 6/17/2013    HEARING LOSS     Hypertension     Prostate cancer     Stroke      Past Surgical History:   Procedure Laterality Date    CATARACT EXTRACTION W/  INTRAOCULAR LENS IMPLANT  2006    os    CORONARY ANGIOPLASTY  1993    LEG AMPUTATION THROUGH KNEE       Family History   Problem Relation Age of Onset    Stroke Father      Social History   Substance Use Topics    Smoking status: Former Smoker     Packs/day: 1.00     Years: 50.00     Types: Cigarettes, Cigars     Quit date: 2/6/1980    Smokeless tobacco: Never Used    Alcohol use No      Review of patient's allergies indicates:  No Known Allergies    (Not in a hospital admission)  Review of Systems:  Constitutional: no fever or chills  Eyes: no visual changes  Ears, nose, mouth, throat, and face: no nasal congestion or sore throat  Respiratory: no cough or " shorness of breath  Cardiovascular: no chest pain or palpitations  Gastrointestinal: no nausea or vomiting, no abdominal pain or change in bowel habits  Genitourinary: no hematuria or dysuria  Integument/breast: no rash or pruritis  Hematologic/lymphatic: no easy bruising or lymphadenopathy  Musculoskeletal: see HPI  Neurological: no seizures or tremors.  Behavioral/Psych: no auditory or visual hallucinations  Endocrine: no heat or cold intolerance     OBJECTIVE:     Vital Signs (Most Recent)  Temp: 97.9 °F (36.6 °C) (11/09/17 1601)  Pulse: 64 (11/09/17 1736)  Resp: 16 (11/09/17 1601)  BP: (!) 182/78 (11/09/17 1736)  SpO2: 99 % (11/09/17 1736)    Physical Exam:  General appearance: well developed, appears stated age  Head: normocephalic, atraumatic  Eyes:  conjunctivae/corneas clear. PERRL.  Nose: Nares normal. Septum midline.  Throat: lips, mucosa, and tongue normal; teeth and gums normal, no throat erythema.  Neck: supple, symmetrical, trachea midline, no JVD and thyroid not enlarged, symmetric, no tenderness/mass/nodules  Lungs:  clear to auscultation bilaterally and normal respiratory effort  Chest wall: no tenderness  Heart: regular rate and rhythm, S1, S2 normal, no murmur, click, rub or gallop  Abdomen: soft, non-tender non-distented; bowel sounds normal; no masses,  no organomegaly  Extremities: no cyanosis, clubbing or edema. Right great toe is warm, tender and slightly swollen . Lateral nail fold of right big toe; superior drainage with erythema and tenderness. Left AKA.    Pulses: 2+ and symmetric  Skin: Skin color, texture, turgor normal. No rashes or lesions.  Lymph nodes: Cervical, supraclavicular, and axillary nodes normal.  Neurologic: Normal strength and tone. No focal numbness or weakness. CNII-XII intact.      Laboratory:   CBC: No results for input(s): WBC, RBC, HGB, HCT, PLT, MCV, MCH, MCHC in the last 168 hours.  CMP: No results for input(s): GLU, CALCIUM, ALBUMIN, PROT, NA, K, CO2, CL, BUN,  CREATININE, ALKPHOS, ALT, AST, BILITOT in the last 168 hours.    Hemoglobin A1C   Date Value Ref Range Status   06/25/2013 5.9 4.0 - 6.2 % Final     Microbiology Results (last 7 days)     ** No results found for the last 168 hours. **        Diagnostic Results:  X-ray right toes: Osteoarthritis at the first metatarsophalangeal joint and interphalangeal joint of the big toe with possible cellulitis. Osteomyelitis or fracture is not identified.    Assessment/Plan:     Active Hospital Problems    Diagnosis  POA    Cellulitis of right toe [L03.031]  Keep extremity elevated.   Continue IV Clindamycin and Vancomycin. Vancomycin doing per pharmacy.  Obtain MRI of right foot to evaluate for osteomyelitis.  Check CRP and ESR.  Consult podiatrist,.    Yes    CKD (chronic kidney disease) stage 3, GFR 30-59 ml/min [N18.3]  Monitor BUN/SCr.  Monitor I/Os.  Monitor electrolytes.    Yes    Status post unilateral above knee amputation [Z89.619]  Supportive care.    Not Applicable    Type 2 diabetes mellitus with renal manifestations, controlled [E11.29]  Check blood glucose level q AC/HS.  Use Novolog Insulin Sliding Scale as needed.   Continue American Diabetic Association 1800 Kcal diet.    Yes    GERD (gastroesophageal reflux disease) [K21.9]  Continue PPI.    Yes    Hypertension [I10]  Chronic problem. Will continue chronic medications and monitor for any changes, adjusting as needed.    Yes    Peripheral arterial disease [I73.9]  Chronic problem. Will continue chronic medications and monitor for any changes, adjusting as needed.    Yes      DVT prophylaxis: Heparin 5K sq q 12 hrs.    Russell Chacon MD  Department of Hospital Medicine   [facility]

## 2017-11-11 PROBLEM — M86.071 ACUTE HEMATOGENOUS OSTEOMYELITIS OF RIGHT FOOT: Status: ACTIVE | Noted: 2017-11-09

## 2017-11-11 PROBLEM — M86.9 OSTEOMYELITIS: Status: ACTIVE | Noted: 2017-11-11

## 2017-11-11 LAB
ANION GAP SERPL CALC-SCNC: 10 MMOL/L
BASOPHILS # BLD AUTO: 0 K/UL
BASOPHILS NFR BLD: 0.2 %
BUN SERPL-MCNC: 20 MG/DL
CALCIUM SERPL-MCNC: 8.8 MG/DL
CHLORIDE SERPL-SCNC: 99 MMOL/L
CO2 SERPL-SCNC: 26 MMOL/L
CREAT SERPL-MCNC: 1.5 MG/DL
DIFFERENTIAL METHOD: ABNORMAL
EOSINOPHIL # BLD AUTO: 0.4 K/UL
EOSINOPHIL NFR BLD: 6.9 %
ERYTHROCYTE [DISTWIDTH] IN BLOOD BY AUTOMATED COUNT: 14.2 %
EST. GFR  (AFRICAN AMERICAN): 46 ML/MIN/1.73 M^2
EST. GFR  (NON AFRICAN AMERICAN): 40 ML/MIN/1.73 M^2
GLUCOSE SERPL-MCNC: 122 MG/DL
HCT VFR BLD AUTO: 25.1 %
HGB BLD-MCNC: 8.3 G/DL
LYMPHOCYTES # BLD AUTO: 1 K/UL
LYMPHOCYTES NFR BLD: 17.5 %
MCH RBC QN AUTO: 27.8 PG
MCHC RBC AUTO-ENTMCNC: 33.2 G/DL
MCV RBC AUTO: 84 FL
MONOCYTES # BLD AUTO: 0.6 K/UL
MONOCYTES NFR BLD: 10.2 %
NEUTROPHILS # BLD AUTO: 3.6 K/UL
NEUTROPHILS NFR BLD: 65.2 %
PLATELET # BLD AUTO: 204 K/UL
PMV BLD AUTO: 9.4 FL
POTASSIUM SERPL-SCNC: 4 MMOL/L
RBC # BLD AUTO: 3.01 M/UL
SODIUM SERPL-SCNC: 135 MMOL/L
WBC # BLD AUTO: 5.5 K/UL

## 2017-11-11 PROCEDURE — 25000003 PHARM REV CODE 250: Performed by: EMERGENCY MEDICINE

## 2017-11-11 PROCEDURE — 25000003 PHARM REV CODE 250: Performed by: INTERNAL MEDICINE

## 2017-11-11 PROCEDURE — 12000002 HC ACUTE/MED SURGE SEMI-PRIVATE ROOM

## 2017-11-11 PROCEDURE — 80048 BASIC METABOLIC PNL TOTAL CA: CPT

## 2017-11-11 PROCEDURE — 63600175 PHARM REV CODE 636 W HCPCS: Performed by: INTERNAL MEDICINE

## 2017-11-11 PROCEDURE — 36415 COLL VENOUS BLD VENIPUNCTURE: CPT

## 2017-11-11 PROCEDURE — 85025 COMPLETE CBC W/AUTO DIFF WBC: CPT

## 2017-11-11 RX ORDER — CLINDAMYCIN PHOSPHATE 600 MG/50ML
600 INJECTION, SOLUTION INTRAVENOUS
Status: DISCONTINUED | OUTPATIENT
Start: 2017-11-11 | End: 2017-11-11

## 2017-11-11 RX ADMIN — PANTOPRAZOLE SODIUM 40 MG: 40 TABLET, DELAYED RELEASE ORAL at 09:11

## 2017-11-11 RX ADMIN — PIPERACILLIN SODIUM AND TAZOBACTAM SODIUM 3.38 G: 3; .375 INJECTION, POWDER, LYOPHILIZED, FOR SOLUTION INTRAVENOUS at 09:11

## 2017-11-11 RX ADMIN — HEPARIN SODIUM 5000 UNITS: 5000 INJECTION, SOLUTION INTRAVENOUS; SUBCUTANEOUS at 09:11

## 2017-11-11 RX ADMIN — HYDROCHLOROTHIAZIDE 25 MG: 25 TABLET ORAL at 09:11

## 2017-11-11 RX ADMIN — PIPERACILLIN SODIUM AND TAZOBACTAM SODIUM 3.38 G: 3; .375 INJECTION, POWDER, LYOPHILIZED, FOR SOLUTION INTRAVENOUS at 02:11

## 2017-11-11 RX ADMIN — PIPERACILLIN SODIUM AND TAZOBACTAM SODIUM 3.38 G: 3; .375 INJECTION, POWDER, LYOPHILIZED, FOR SOLUTION INTRAVENOUS at 06:11

## 2017-11-11 RX ADMIN — ASPIRIN 81 MG: 81 TABLET, COATED ORAL at 09:11

## 2017-11-11 RX ADMIN — ATORVASTATIN CALCIUM 20 MG: 20 TABLET, FILM COATED ORAL at 09:11

## 2017-11-11 RX ADMIN — LISINOPRIL 40 MG: 40 TABLET ORAL at 09:11

## 2017-11-11 NOTE — TRANSFER OF CARE
"Anesthesia Transfer of Care Note    Patient: Michael Shin    Procedure(s) Performed: Procedure(s) (LRB):  BIOPSY-EXCISIONAL (Right)    Patient location: PACU    Anesthesia Type: MAC    Transport from OR: Transported from OR on room air with adequate spontaneous ventilation    Post pain: adequate analgesia    Post assessment: no apparent anesthetic complications    Post vital signs: stable    Level of consciousness: awake and alert    Nausea/Vomiting: no nausea/vomiting    Complications: none    Transfer of care protocol was followed      Last vitals:   Visit Vitals  BP (!) 170/70   Pulse (!) 59   Temp 36.8 °C (98.3 °F) (Skin)   Resp 16   Ht 5' 6" (1.676 m)   Wt 48.1 kg (106 lb)   SpO2 100%   BMI 17.11 kg/m²     "

## 2017-11-11 NOTE — OP NOTE
DATE OF PROCEDURE:  11/10/2017.    PREOPERATIVE DIAGNOSIS:  Osteomyelitis of the distal phalanx, first toe, right   foot.    POSTOPERATIVE DIAGNOSIS:  Osteomyelitis of the distal phalanx, first toe, right   foot along with paronychia and ingrown toenail.    SURGEON:  Floyd Lackey DPM.    ANESTHESIA:  Local anesthesia with monitored anesthesia care.    I performed an excision of an ingrown toenail and incision and drainage of   paronychia with culture and sensitivity for aerobic and anaerobic and needle   bone biopsy of the distal phalanx for culture and sensitivity, aerobic,   anaerobic and acid-fast for osteomyelitis.    DESCRIPTION OF PROCEDURE:  The patient was brought to the Operating Room, left   on the stretcher in a supine position.  I performed a local anesthetic block of   his right great toe with 1% plain Xylocaine using a total of 4 mL.  The right   foot was prepped and draped.  No tourniquet was used.  When I squeezed on the   toe, there was some purulent drainage that came from the paronychia.  I did a   culture and sensitivity for aerobic, anaerobic, acid-fast for this.  Next, I   used a small  to cut the medial side of the great toenail, removed it   from the skin, which then allowed the rest of the paronychia to drain.  Since   she had a positive MRI with a bone infection at this distal phalanx area under   this paronychia site, I used a Jamshidi needle to perform a bone biopsy, sent a   little piece of bone, aerobic and anaerobic and acid fast cultures and dry   sterile container.  I irrigated the area.  No sutures were used.  I dressed it   with adaptic 4/4, Ephraim, Ace wrap.  The patient left the operating room with   stable vitals.      MARK/ROLF  dd: 11/10/2017 18:36:58 (CST)  td: 11/11/2017 03:02:48 (CST)  Doc ID   #[id number]  Job ID #[id number]    CC:

## 2017-11-11 NOTE — ANESTHESIA POSTPROCEDURE EVALUATION
"Anesthesia Post Evaluation    Patient: Michael Shin    Procedure(s) Performed: Procedure(s) (LRB):  BIOPSY-EXCISIONAL (Right)    Final Anesthesia Type: MAC  Patient location during evaluation: PACU  Patient participation: Yes- Able to Participate  Level of consciousness: awake and alert  Post-procedure vital signs: reviewed and stable  Pain management: adequate  Airway patency: patent  PONV status at discharge: No PONV  Anesthetic complications: no      Cardiovascular status: hemodynamically stable  Respiratory status: unassisted and room air  Hydration status: euvolemic  Follow-up not needed.        Visit Vitals  /72   Pulse 63   Temp 36.7 °C (98 °F)   Resp 14   Ht 5' 6" (1.676 m)   Wt 48.1 kg (106 lb)   SpO2 96%   BMI 17.11 kg/m²       Pain/Rachel Score: Pain Assessment Performed: Yes (11/10/2017  6:30 PM)  Presence of Pain: denies (11/10/2017  6:30 PM)  Rachel Score: 10 (11/10/2017  6:30 PM)      "

## 2017-11-11 NOTE — PROGRESS NOTES
Afebrile   S/p incision and drainage of paronychia and bone biopsy  No complications  Right great toe looks ok, no ischemia, no remaining pus  The cultures are young, and in progress.  Will f/u on cultures and give po antibiotic recs for Monday  Would prefer to treat him conservatively to reduce potential complications of treatment

## 2017-11-11 NOTE — PLAN OF CARE
Problem: Patient Care Overview  Goal: Plan of Care Review  Outcome: Ongoing (interventions implemented as appropriate)  Pt alert. Ambulatory to chair with assistance. VSS. Tolerating diet. Wound dressing intact. Daughter at bedside.No new complaints. Will continue to monitor.

## 2017-11-11 NOTE — ANESTHESIA PREPROCEDURE EVALUATION
11/10/2017  Michael Shin is a 89 y.o., male.    Anesthesia Evaluation    I have reviewed the Patient Summary Reports.    I have reviewed the Nursing Notes.   I have reviewed the Medications.     Review of Systems  Anesthesia Hx:  No problems with previous Anesthesia    Social:  Former Smoker    Hematology/Oncology:  Hematology Normal       -- Cancer in past history:  Other (see Oncology comments)   EENT/Dental:   Several missing teeth. None loose per patient. Blind in one eye    Cardiovascular:   Hypertension CAD      Pulmonary:  Pulmonary Normal    Renal/:   Chronic Renal Disease, CRI    Hepatic/GI:   GERD    Musculoskeletal:  Musculoskeletal Normal    Neurological:   CVA    Endocrine:   Diabetes, type 2        Physical Exam  General:  Malnutrition    Airway/Jaw/Neck:  Airway Findings: Mouth Opening: Normal Tongue: Normal  General Airway Assessment: Adult  Mallampati: I  TM Distance: Normal, at least 6 cm        Eyes/Ears/Nose:  EYES/EARS/NOSE FINDINGS: Normal   Dental:  Dental Findings:Essentially edentulous    Chest/Lungs:  Chest/Lungs Findings: Clear to auscultation, Normal Respiratory Rate     Heart/Vascular:  Heart Findings: Rate: Normal  Rhythm: Regular Rhythm  Sounds: Normal     Abdomen:  Abdomen Findings: Normal    Musculoskeletal:  Musculoskeletal Findings: Amputation    Skin:  Skin Findings: Normal    Mental Status:  Mental Status Findings: Normal        Anesthesia Plan  Type of Anesthesia, risks & benefits discussed:  Anesthesia Type:  MAC  Patient's Preference:   Intra-op Monitoring Plan: standard ASA monitors  Intra-op Monitoring Plan Comments:   Post Op Pain Control Plan: IV/PO Opioids PRN  Post Op Pain Control Plan Comments:   Induction:    Beta Blocker:  Patient is not currently on a Beta-Blocker (No further documentation required).       Informed Consent: Patient understands risks and  agrees with Anesthesia plan.  Questions answered. Anesthesia consent signed with patient.  ASA Score: 3     Day of Surgery Review of History & Physical: I have interviewed and examined the patient. I have reviewed the patient's H&P dated:  There are no significant changes.          Ready For Surgery From Anesthesia Perspective.

## 2017-11-12 LAB
ANION GAP SERPL CALC-SCNC: 8 MMOL/L
BASOPHILS # BLD AUTO: 0 K/UL
BASOPHILS NFR BLD: 0.4 %
BUN SERPL-MCNC: 19 MG/DL
CALCIUM SERPL-MCNC: 8.9 MG/DL
CHLORIDE SERPL-SCNC: 101 MMOL/L
CO2 SERPL-SCNC: 27 MMOL/L
CREAT SERPL-MCNC: 1.7 MG/DL
DIFFERENTIAL METHOD: ABNORMAL
EOSINOPHIL # BLD AUTO: 0.4 K/UL
EOSINOPHIL NFR BLD: 7.2 %
ERYTHROCYTE [DISTWIDTH] IN BLOOD BY AUTOMATED COUNT: 14.9 %
EST. GFR  (AFRICAN AMERICAN): 40 ML/MIN/1.73 M^2
EST. GFR  (NON AFRICAN AMERICAN): 34 ML/MIN/1.73 M^2
GLUCOSE SERPL-MCNC: 145 MG/DL
HCT VFR BLD AUTO: 25.5 %
HGB BLD-MCNC: 8.5 G/DL
LYMPHOCYTES # BLD AUTO: 1 K/UL
LYMPHOCYTES NFR BLD: 18.2 %
MCH RBC QN AUTO: 27.7 PG
MCHC RBC AUTO-ENTMCNC: 33.1 G/DL
MCV RBC AUTO: 84 FL
MONOCYTES # BLD AUTO: 0.7 K/UL
MONOCYTES NFR BLD: 11.9 %
NEUTROPHILS # BLD AUTO: 3.5 K/UL
NEUTROPHILS NFR BLD: 62.3 %
PLATELET # BLD AUTO: 201 K/UL
PMV BLD AUTO: 9 FL
POTASSIUM SERPL-SCNC: 4.7 MMOL/L
RBC # BLD AUTO: 3.06 M/UL
SODIUM SERPL-SCNC: 136 MMOL/L
WBC # BLD AUTO: 5.6 K/UL

## 2017-11-12 PROCEDURE — 80048 BASIC METABOLIC PNL TOTAL CA: CPT

## 2017-11-12 PROCEDURE — 85025 COMPLETE CBC W/AUTO DIFF WBC: CPT

## 2017-11-12 PROCEDURE — 63600175 PHARM REV CODE 636 W HCPCS: Performed by: INTERNAL MEDICINE

## 2017-11-12 PROCEDURE — 12000002 HC ACUTE/MED SURGE SEMI-PRIVATE ROOM

## 2017-11-12 PROCEDURE — 25000003 PHARM REV CODE 250: Performed by: EMERGENCY MEDICINE

## 2017-11-12 PROCEDURE — 36415 COLL VENOUS BLD VENIPUNCTURE: CPT

## 2017-11-12 PROCEDURE — 63600175 PHARM REV CODE 636 W HCPCS: Performed by: HOSPITALIST

## 2017-11-12 PROCEDURE — 25000003 PHARM REV CODE 250: Performed by: PODIATRIST

## 2017-11-12 RX ORDER — MUPIROCIN 20 MG/G
OINTMENT TOPICAL DAILY
Status: DISCONTINUED | OUTPATIENT
Start: 2017-11-12 | End: 2017-11-13 | Stop reason: HOSPADM

## 2017-11-12 RX ADMIN — HEPARIN SODIUM 5000 UNITS: 5000 INJECTION, SOLUTION INTRAVENOUS; SUBCUTANEOUS at 08:11

## 2017-11-12 RX ADMIN — LISINOPRIL 40 MG: 40 TABLET ORAL at 09:11

## 2017-11-12 RX ADMIN — HEPARIN SODIUM 5000 UNITS: 5000 INJECTION, SOLUTION INTRAVENOUS; SUBCUTANEOUS at 09:11

## 2017-11-12 RX ADMIN — PANTOPRAZOLE SODIUM 40 MG: 40 TABLET, DELAYED RELEASE ORAL at 09:11

## 2017-11-12 RX ADMIN — HYDROCHLOROTHIAZIDE 25 MG: 25 TABLET ORAL at 09:11

## 2017-11-12 RX ADMIN — ATORVASTATIN CALCIUM 20 MG: 20 TABLET, FILM COATED ORAL at 09:11

## 2017-11-12 RX ADMIN — PIPERACILLIN SODIUM AND TAZOBACTAM SODIUM 3.38 G: 3; .375 INJECTION, POWDER, LYOPHILIZED, FOR SOLUTION INTRAVENOUS at 09:11

## 2017-11-12 RX ADMIN — PIPERACILLIN SODIUM AND TAZOBACTAM SODIUM 3.38 G: 3; .375 INJECTION, POWDER, LYOPHILIZED, FOR SOLUTION INTRAVENOUS at 02:11

## 2017-11-12 RX ADMIN — PIPERACILLIN SODIUM AND TAZOBACTAM SODIUM 3.38 G: 3; .375 INJECTION, POWDER, LYOPHILIZED, FOR SOLUTION INTRAVENOUS at 05:11

## 2017-11-12 RX ADMIN — MUPIROCIN: 20 OINTMENT TOPICAL at 09:11

## 2017-11-12 RX ADMIN — ASPIRIN 81 MG: 81 TABLET, COATED ORAL at 09:11

## 2017-11-12 NOTE — PLAN OF CARE
Problem: Patient Care Overview  Goal: Plan of Care Review  Outcome: Ongoing (interventions implemented as appropriate)  Pt alert and mostly oriented, very [place], pt blind in Left eye, using urinal at times, infection in rt great toe, safety monitored and maintained, call light next to pt

## 2017-11-12 NOTE — PROGRESS NOTES
"Progress Note  [facility] Hospital Medicine    Admit Date: 11/9/2017    SUBJECTIVE:     Follow-up For:  Acute hematogenous osteomyelitis of right foot      Interval history (See H&P for complete P,F,SHx) : Patient did well overnight.  No acute events.  Remains afebrile.  MRI done shows evidence of marrow edema and consistent with a mild osteomyelitis along with soft tissue edema as well.  Dr. Joiner's notes reviewed.  Patient underwent debridement by podiatry 11/10.    Review of Systems:   Pain scale: 0/10  Gen- Weakness/ Fatigue  Extrem- Pain/Swelling        OBJECTIVE:     Vital Signs Range (Last 24H):  Temp:  [97.9 °F (36.6 °C)-98.3 °F (36.8 °C)]   Pulse:  [62-70]   Resp:  [16-18]   BP: (106-157)/(54-72)   SpO2:  [96 %-100 %]     I & O (Last 24H):    Intake/Output Summary (Last 24 hours) at 11/12/17 1559  Last data filed at 11/12/17 1500   Gross per 24 hour   Intake              800 ml   Output                0 ml   Net              800 ml       Estimated body mass index is 44.59 kg/m² as calculated from the following:    Height as of this encounter: 5' 6" (1.676 m).    Weight as of this encounter: 125.3 kg (276 lb 3.8 oz).    Physical Exam:  General- Patient alert and oriented x3 in NAD  HEENT- PERRLA, EOMI, OP clear, MMM  Neck- No JVD, Lymphadenopathy, Thyromegaly  CV- Regular rate and rhythm, No Murmur/mami/rubs  Resp- Lungs CTA Bilaterally, No increased WOB  GI- Non tender/non-distended, BS normoactive x4 quads, no HSM  Extrem- Extremity exam positive noted left lower extremity BKA.  Positive noted right lower extremity lesion under bandage which was not taken down. Decreased peripheral pulse  Neuro- Strength 5/5 flexors/extensors, DTRs 2+ and symmetric, Intact sensation to light touch grossly  Skin-  No rashes, masses or lesions noted    Laboratory/Diagnostic Data:  Reviewed and noted in plan where applicable- Please see chart for full lab data.    Medications:  Medication list was reviewed and " changes noted under Assessment/Plan.    ASSESSMENT/PLAN:     Active Problems:    Active Hospital Problems    Diagnosis  POA    *Acute osteomyelitis of right foot [M86.071] patient on an IV antibiotics per Dr. Joiner but at high risk for complications due to age and long-term IV antibiotics.  Also given perfusion issues likely would remain a chronic issue.  Will defer further antibiotic treatment to Dr. Joiner. Still awaiting cultures.  Yes    Malnutrition of moderate degree [E44.0]- Nutrition consulted. Body mass index is 44.59 kg/m².. Encourage maximal PO intake. Diet supplementation ordered per nutrition approval. Will encourage PO and monitor closely for weight changes.  Yes    CKD (chronic kidney disease) stage 3, GFR 30-59 ml/min [N18.3]- Creatine stable for now. Monitor UOP and serial BMP and adjust therapy as needed. Renally dose meds.  Yes    Status post unilateral above knee amputation [Z89.619]-  Chronic  Not Applicable    Controlled type 2 diabetes mellitus with stage 3 chronic kidney disease [E11.22, N18.3]-   Patient's FSGs are controlled on current hypoglycemics.   Last A1c reviewed-   Lab Results   Component Value Date    HGBA1C 5.9 06/25/2013     Current correctional scale  Low  Maintain insulin dose as follows- SSI  Yes    Hypertension [I10]- Chronic, controlled.  Monitor BP closely.  Will continue BP medications as needed for sustained BP control.  Yes    Peripheral arterial disease [I73.9]-  Severe, nonoperable d/t age.  Yes      Resolved Hospital Problems    Diagnosis Date Resolved POA   No resolved problems to display.         Disposition- Home    VTE Risk Mitigation         Ordered     heparin (porcine) injection 5,000 Units  Every 12 hours     Route:  Subcutaneous        11/09/17 1955     Medium Risk of VTE  Once      11/09/17 1955     Place sequential compression device  Until discontinued      11/09/17 1955     Place JULIAN hose  Until discontinued      11/09/17 1955

## 2017-11-12 NOTE — PLAN OF CARE
11/11/17 2347   Patient Assessment/Suction   Level of Consciousness (AVPU) alert   Respiratory Effort Normal;Unlabored   Expansion/Accessory Muscles/Retractions no use of accessory muscles   PRE-TX-O2-ETCO2   O2 Device (Oxygen Therapy) room air   SpO2 100 %   Pulse 63   Resp 16   Pt assessed, no distress noted.

## 2017-11-12 NOTE — PROGRESS NOTES
"Progress Note  [facility] Medicine    Admit Date: 11/9/2017    SUBJECTIVE:     Follow-up For:  Acute hematogenous osteomyelitis of right foot      Interval history (See H&P for complete P,F,SHx) : Patient did well overnight.  No acute events.  Remains afebrile.  MRI done shows evidence of marrow edema and consistent with a mild osteomyelitis along with soft tissue edema as well.  Dr. Joiner's notes reviewed.  Patient underwent debridement by podiatry yesterday.    Review of Systems:   Pain scale: 0/10  Gen- Weakness/ Fatigue  Extrem- Pain/Swelling        OBJECTIVE:     Vital Signs Range (Last 24H):  Temp:  [97.4 °F (36.3 °C)-98.4 °F (36.9 °C)]   Pulse:  [62-82]   Resp:  [18-20]   BP: (106-186)/(54-79)   SpO2:  [98 %-100 %]     I & O (Last 24H):    Intake/Output Summary (Last 24 hours) at 11/11/17 2252  Last data filed at 11/11/17 1812   Gross per 24 hour   Intake           936.67 ml   Output              400 ml   Net           536.67 ml       Estimated body mass index is 17.11 kg/m² as calculated from the following:    Height as of this encounter: 5' 6" (1.676 m).    Weight as of this encounter: 48.1 kg (106 lb).    Physical Exam:  General- Patient alert and oriented x3 in NAD  HEENT- PERRLA, EOMI, OP clear, MMM  Neck- No JVD, Lymphadenopathy, Thyromegaly  CV- Regular rate and rhythm, No Murmur/mami/rubs  Resp- Lungs CTA Bilaterally, No increased WOB  GI- Non tender/non-distended, BS normoactive x4 quads, no HSM  Extrem- Extremity exam positive noted left lower extremity BKA.  Positive noted right lower extremity lesion under bandage which was not taken down. Decreased peripheral pulse  Neuro- Strength 5/5 flexors/extensors, DTRs 2+ and symmetric, Intact sensation to light touch grossly  Skin-  No rashes, masses or lesions noted    Laboratory/Diagnostic Data:  Reviewed and noted in plan where applicable- Please see chart for full lab data.    Medications:  Medication list was reviewed and changes " noted under Assessment/Plan.    ASSESSMENT/PLAN:     Active Problems:    Active Hospital Problems    Diagnosis  POA    *Acute osteomyelitis of right foot [M86.071] patient on an IV antibiotics per Dr. Joiner but at high risk for complications due to age and long-term IV antibiotics.  Also given perfusion issues likely would remain a chronic issue.  Will defer further antibiotic treatment to Dr. Joiner.  Yes    Malnutrition of moderate degree [E44.0]- Nutrition consulted. Body mass index is 17.11 kg/m².. Encourage maximal PO intake. Diet supplementation ordered per nutrition approval. Will encourage PO and monitor closely for weight changes.  Yes    CKD (chronic kidney disease) stage 3, GFR 30-59 ml/min [N18.3]- Creatine stable for now. Monitor UOP and serial BMP and adjust therapy as needed. Renally dose meds.  Yes    Status post unilateral above knee amputation [Z89.619]-  Chronic  Not Applicable    Controlled type 2 diabetes mellitus with stage 3 chronic kidney disease [E11.22, N18.3]-   Patient's FSGs are controlled on current hypoglycemics.   Last A1c reviewed-   Lab Results   Component Value Date    HGBA1C 5.9 06/25/2013     Current correctional scale  Low  Maintain insulin dose as follows- SSI  Yes    Hypertension [I10]- Chronic, controlled.  Monitor BP closely.  Will continue BP medications as needed for sustained BP control.  Yes    Peripheral arterial disease [I73.9]-  Severe, nonoperable d/t age.  Yes      Resolved Hospital Problems    Diagnosis Date Resolved POA   No resolved problems to display.         Disposition- Home    VTE Risk Mitigation         Ordered     heparin (porcine) injection 5,000 Units  Every 12 hours     Route:  Subcutaneous        11/09/17 1955     Medium Risk of VTE  Once      11/09/17 1955     Place sequential compression device  Until discontinued      11/09/17 1955     Place JULIAN hose  Until discontinued      11/09/17 1955

## 2017-11-12 NOTE — PLAN OF CARE
Problem: Patient Care Overview  Goal: Plan of Care Review  Outcome: Ongoing (interventions implemented as appropriate)  Pt alert and oriented. Tolerating diet and boost. No new symptoms. VSS. Brief changed and bath given. No pain or nausea at this time. Will continue to monitor.

## 2017-11-12 NOTE — PROGRESS NOTES
Discussed with Dr. Lackey  Cultures reviewed remotely and are no significant isolate so far  If the cultures tomorrow are negative, would discharge with po doxycycline 100 mg capsules(these are better tolerated) BID for 2-3 months.  Can follow up in office with me.

## 2017-11-13 VITALS
HEART RATE: 69 BPM | RESPIRATION RATE: 18 BRPM | BODY MASS INDEX: 44.39 KG/M2 | DIASTOLIC BLOOD PRESSURE: 82 MMHG | SYSTOLIC BLOOD PRESSURE: 186 MMHG | WEIGHT: 276.25 LBS | TEMPERATURE: 98 F | OXYGEN SATURATION: 100 % | HEIGHT: 66 IN

## 2017-11-13 LAB
ANION GAP SERPL CALC-SCNC: 10 MMOL/L
BACTERIA SPEC AEROBE CULT: NORMAL
BASOPHILS # BLD AUTO: 0 K/UL
BASOPHILS NFR BLD: 0.1 %
BUN SERPL-MCNC: 19 MG/DL
CALCIUM SERPL-MCNC: 8.8 MG/DL
CHLORIDE SERPL-SCNC: 102 MMOL/L
CO2 SERPL-SCNC: 24 MMOL/L
CREAT SERPL-MCNC: 1.8 MG/DL
DIFFERENTIAL METHOD: ABNORMAL
EOSINOPHIL # BLD AUTO: 0.2 K/UL
EOSINOPHIL NFR BLD: 2.3 %
ERYTHROCYTE [DISTWIDTH] IN BLOOD BY AUTOMATED COUNT: 15 %
EST. GFR  (AFRICAN AMERICAN): 37 ML/MIN/1.73 M^2
EST. GFR  (NON AFRICAN AMERICAN): 32 ML/MIN/1.73 M^2
GLUCOSE SERPL-MCNC: 166 MG/DL
HCT VFR BLD AUTO: 24.7 %
HGB BLD-MCNC: 8.1 G/DL
LYMPHOCYTES # BLD AUTO: 1.1 K/UL
LYMPHOCYTES NFR BLD: 14 %
MCH RBC QN AUTO: 27.3 PG
MCHC RBC AUTO-ENTMCNC: 32.8 G/DL
MCV RBC AUTO: 83 FL
MONOCYTES # BLD AUTO: 0.6 K/UL
MONOCYTES NFR BLD: 8 %
NEUTROPHILS # BLD AUTO: 6 K/UL
NEUTROPHILS NFR BLD: 75.6 %
PLATELET # BLD AUTO: 202 K/UL
PMV BLD AUTO: 9.1 FL
POTASSIUM SERPL-SCNC: 3.9 MMOL/L
RBC # BLD AUTO: 2.97 M/UL
SODIUM SERPL-SCNC: 136 MMOL/L
WBC # BLD AUTO: 8 K/UL

## 2017-11-13 PROCEDURE — 25000003 PHARM REV CODE 250: Performed by: EMERGENCY MEDICINE

## 2017-11-13 PROCEDURE — 63600175 PHARM REV CODE 636 W HCPCS: Performed by: HOSPITALIST

## 2017-11-13 PROCEDURE — 85025 COMPLETE CBC W/AUTO DIFF WBC: CPT

## 2017-11-13 PROCEDURE — 99239 HOSP IP/OBS DSCHRG MGMT >30: CPT | Mod: ,,, | Performed by: INTERNAL MEDICINE

## 2017-11-13 PROCEDURE — 36415 COLL VENOUS BLD VENIPUNCTURE: CPT

## 2017-11-13 PROCEDURE — 63600175 PHARM REV CODE 636 W HCPCS: Performed by: INTERNAL MEDICINE

## 2017-11-13 PROCEDURE — 80048 BASIC METABOLIC PNL TOTAL CA: CPT

## 2017-11-13 RX ORDER — DOXYCYCLINE 100 MG/1
100 CAPSULE ORAL 2 TIMES DAILY
Qty: 180 CAPSULE | Refills: 0 | Status: SHIPPED | OUTPATIENT
Start: 2017-11-13 | End: 2017-11-23

## 2017-11-13 RX ADMIN — ATORVASTATIN CALCIUM 20 MG: 20 TABLET, FILM COATED ORAL at 10:11

## 2017-11-13 RX ADMIN — PIPERACILLIN SODIUM AND TAZOBACTAM SODIUM 3.38 G: 3; .375 INJECTION, POWDER, LYOPHILIZED, FOR SOLUTION INTRAVENOUS at 10:11

## 2017-11-13 RX ADMIN — ACETAMINOPHEN 650 MG: 325 TABLET, FILM COATED ORAL at 12:11

## 2017-11-13 RX ADMIN — HEPARIN SODIUM 5000 UNITS: 5000 INJECTION, SOLUTION INTRAVENOUS; SUBCUTANEOUS at 10:11

## 2017-11-13 RX ADMIN — ACETAMINOPHEN 650 MG: 325 TABLET, FILM COATED ORAL at 07:11

## 2017-11-13 RX ADMIN — LISINOPRIL 40 MG: 40 TABLET ORAL at 10:11

## 2017-11-13 RX ADMIN — PANTOPRAZOLE SODIUM 40 MG: 40 TABLET, DELAYED RELEASE ORAL at 10:11

## 2017-11-13 RX ADMIN — MUPIROCIN: 20 OINTMENT TOPICAL at 10:11

## 2017-11-13 RX ADMIN — PIPERACILLIN SODIUM AND TAZOBACTAM SODIUM 3.38 G: 3; .375 INJECTION, POWDER, LYOPHILIZED, FOR SOLUTION INTRAVENOUS at 02:11

## 2017-11-13 RX ADMIN — ASPIRIN 81 MG: 81 TABLET, COATED ORAL at 10:11

## 2017-11-13 RX ADMIN — HYDROCHLOROTHIAZIDE 25 MG: 25 TABLET ORAL at 10:11

## 2017-11-13 NOTE — DISCHARGE SUMMARY
"Discharge Summary  Hospital Medicine    Admit Date: 11/9/2017    Date and Time: 11/13/20171:53 PM    Discharge Attending Physician: Russell Chacon MD    Primary Care Physician: Primary Doctor No    Diagnoses:  Active Hospital Problems    Diagnosis  POA    *Acute osteomyelitis of right foot [M86.071]  Yes    Cellulitis of right toe [L03.031]  Unknown    Malnutrition of moderate degree [E44.0]  Yes    CKD (chronic kidney disease) stage 3, GFR 30-59 ml/min [N18.3]  Yes    Status post unilateral above knee amputation [Z89.619]  Not Applicable    Controlled type 2 diabetes mellitus with stage 3 chronic kidney disease [E11.22, N18.3]  Yes    Hypertension [I10]  Yes    Peripheral arterial disease [I73.9]  Yes      Resolved Hospital Problems    Diagnosis Date Resolved POA   No resolved problems to display.     Discharged Condition: Good    Hospital Course:   Patient is a 89 y.o. male admitted to Hospitalist Service from [facility] Emergency Room with complaint of right great toe infection. Patient reportedly has past medical history significant for chronic anemia, CKD -3, hypertension, history or prostate cancer, PAD s/p left AKA and CAD. Patient was sent from [facility] for concern for right great toe infection. Patient is a poor historian. Patient reports that his "toe has been bugging me on and off for 3-4 weeks." He denies having a fever or vomiting. The patient does not endorse having a history of DM. He has not taken any antibiotics for his toe. Patient denied chest pain, shortness of breath, abdominal pain, nausea, vomiting, headache, vision changes, focal neuro-deficits, cough or fever. Patient was admitted to Hospitalist medicine service. Patient was evaluated by Dr. Joiner and Dr. Lackey. Patient under went right big toe distal phalynx biopsy. Cultures results are negative for infection. Patient was started on IV antibiotics. Patient encouraged to elevate extremity. Routine wound care " continued. Patient's symptoms improved and patient transitioned to PO antibiotics. Patient to continue close follow up with his doctors. Patient was discharged home in stable condition with following discharge plan of care. Total time with the patient was 30 minutes and greater than 50% was spent in counseling and coordination of care. The assessment and plan have been discussed at length. Physicians' notes reviewed. Labs and procedure reviewed.     Consults: Dr. Joiner, Dr. 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 Osteomyelitis or fracture is not identified.     MRI right foot:  1.  Features of acute osteomyelitis involving the distal end of the right first distal phalanx.    2. Soft tissue edema elsewhere in the right forefoot most notably involving the right great toe, consistent with cellulitis and/or dependent edema. No focal fluid collection to suggest abscess is identified.     Bilateral lower extremity arterial doppler scan:  No elevated peak systolic velocities in the visualized arteries of the right lower extremity suggesting hemodynamically significant narrowing but with color images suggesting severe stenosis in the popliteal artery and with the LIZ on the right in the range consistent with mild to moderate disease.  An above-the-knee amputation on the left.    Microbiology Results (last 7 days)     Procedure Component Value Units Date/Time    Aerobic culture [[id number]] Collected:  11/10/17 1826    Order Status:  Completed Specimen:  Tissue from Toe, Right Foot Updated:  11/13/17 1016     Aerobic Bacterial Culture No significant isolate to date    Aerobic culture [[id number]] Collected:  11/10/17 1830    Order Status:  Completed Specimen:  Bone from Toe, Right Foot Updated:  11/13/17 1014     Aerobic Bacterial Culture Skin cornel,  no predominant organism     Culture, Anaerobe [[id number]] Collected:  11/10/17 1826    Order Status:  Completed Specimen:  Tissue from Toe, Right Foot Updated:  11/13/17 0829     Anaerobic Culture Culture in progress    Culture, Anaerobe [[id number]] Collected:  11/10/17 1830    Order Status:  Completed Specimen:  Bone from Toe, Right Foot Updated:  11/13/17 0829     Anaerobic Culture Culture in progress    AFB Culture & Smear [[id number]] Collected:  11/10/17 1826    Order Status:  Completed Specimen:  Tissue from Toe, Right Foot Updated:  11/12/17 0927     AFB Culture & Smear Culture in progress    AFB Culture & Smear [[id number]] Collected:  11/10/17 1830    Order Status:  Completed Specimen:  Bone from Toe, Right Foot Updated:  11/12/17 0927     AFB Culture & Smear Culture in progress        Special Treatments/Procedures: as above  Disposition: Home or Self Care    Medications:  Reconciled Home Medications: Current Discharge Medication List      START taking these medications    Details   doxycycline (VIBRAMYCIN) 100 MG Cap Take 1 capsule (100 mg total) by mouth 2 (two) times daily.  Qty: 180 capsule, Refills: 0         CONTINUE these medications which have NOT CHANGED    Details   aspirin (ECOTRIN) 81 MG EC tablet Take 81 mg by mouth once daily.      atorvastatin (LIPITOR) 40 MG tablet Take 20 mg by mouth once daily.      docusate sodium (COLACE) 100 MG capsule Take 100 mg by mouth nightly as needed for Constipation. As a stool softener      hydroCHLOROthiazide (HYDRODIURIL) 50 MG tablet Take 25 mg by mouth once daily.      omeprazole (PRILOSEC OTC) 20 MG tablet Take 20 mg by mouth once daily. Take on empty stomach      lisinopril (PRINIVIL,ZESTRIL) 40 MG tablet Take 1 tablet (40 mg total) by mouth once daily.  Qty: 30 tablet, Refills: 5    Associated Diagnoses: Hypertension             Discharge Procedure Orders  Diet general   Order Comments: Cardiac/ 2 gram sodium low cholesterol diet     Diet Diabetic 1800 Calories     Other restrictions  (specify):   Order Comments: Fall precautions   Scheduling Instructions: Keep right foot elevated     Call MD for:   Order Comments: For worsening symptoms, chest pain, shortness of breath, increased abdominal pain, high grade fever, stroke or stroke like symptoms, immediately go to the nearest Emergency Room or call 911 as soon as possible.     Change dressing (specify)   Order Comments: Dressing change: Daily dressing changes as per Dr. Lackey's orders.  Wash with wound cleanser daily and cover with guaze daily.       Follow-up Information     Floyd Lackey DPM On 11/20/2017.    Specialty:  Podiatry  Why:  @11:00am   Contact information:  [address]  [facility] [address]  [phone]             [facility].    Specialty:  Internal Medicine  Contact information:  [address]  [address]  [facility] [address]  [phone]             Meenu Joiner MD In 3 weeks.    Specialty:  Infectious Diseases  Contact information:  [address]  [address]  [facility] [address]  [phone]

## 2017-11-13 NOTE — NURSING
Discharge instructions given to patient and mpoa daughter verbalized understanding Discontinued piv in right ac catheter tip intact. Prescription given pt left with daughter via wheelchair relinquished care [id number].

## 2017-11-14 LAB — BACTERIA SPEC AEROBE CULT: NORMAL

## 2017-11-15 NOTE — PLAN OF CARE
11/15/17 0756   Final Note   Assessment Type Final Discharge Note   Discharge Disposition Home   Hospital Follow Up  Appt(s) scheduled? Yes   Discharge plans and expectations educations in teach back method with documentation complete? Yes

## 2017-11-16 LAB
BACTERIA SPEC ANAEROBE CULT: NORMAL
BACTERIA SPEC ANAEROBE CULT: NORMAL

## 2018-01-13 LAB
ACID FAST MOD KINY STN SPEC: NORMAL
ACID FAST MOD KINY STN SPEC: NORMAL
MYCOBACTERIUM SPEC QL CULT: NORMAL
MYCOBACTERIUM SPEC QL CULT: NORMAL

## 2019-10-02 ENCOUNTER — PES CALL (OUTPATIENT)
Dept: ADMINISTRATIVE | Facility: CLINIC | Age: 84
End: 2019-10-02

## 2021-03-06 ENCOUNTER — IMMUNIZATION (OUTPATIENT)
Dept: PRIMARY CARE CLINIC | Facility: CLINIC | Age: 86
End: 2021-03-06
Payer: MEDICARE

## 2021-03-06 DIAGNOSIS — Z23 NEED FOR VACCINATION: Primary | ICD-10-CM

## 2021-03-06 PROCEDURE — 0031A COVID-19,VECTOR-NR,RS-AD26,PF,0.5 ML DOSE VACCINE (JANSSEN): CPT | Mod: CV19,S$GLB,, | Performed by: FAMILY MEDICINE

## 2021-03-06 PROCEDURE — 91303 COVID-19,VECTOR-NR,RS-AD26,PF,0.5 ML DOSE VACCINE (JANSSEN): CPT | Mod: S$GLB,,, | Performed by: FAMILY MEDICINE

## 2021-03-06 PROCEDURE — 91303 COVID-19,VECTOR-NR,RS-AD26,PF,0.5 ML DOSE VACCINE (JANSSEN): ICD-10-PCS | Mod: S$GLB,,, | Performed by: FAMILY MEDICINE

## 2021-03-06 PROCEDURE — 0031A COVID-19,VECTOR-NR,RS-AD26,PF,0.5 ML DOSE VACCINE (JANSSEN): ICD-10-PCS | Mod: CV19,S$GLB,, | Performed by: FAMILY MEDICINE

## (undated) DEVICE — SYR 10CC LUER LOCK

## (undated) DEVICE — SWAB BBL CULTURETTE

## (undated) DEVICE — SEE MEDLINE ITEM 157117

## (undated) DEVICE — PACK CUSTOM UNIV BASIN SLI

## (undated) DEVICE — SEE MEDLINE ITEM 146308

## (undated) DEVICE — GAUZE SPONGE BULKEE 6X6.75IN

## (undated) DEVICE — NDL BONE MAR JAMSHIDI 11G 4CM

## (undated) DEVICE — COLLECTOR SPECIMEN ANAEROBIC

## (undated) DEVICE — PACK ARTHROSCOPY W/ISO BAC

## (undated) DEVICE — ELECTRODE REM PLYHSV RETURN 9

## (undated) DEVICE — SPONGE DERMACEA GAUZE 4X4

## (undated) DEVICE — DRESSING N ADH OIL EMUL 3X3

## (undated) DEVICE — SEE MEDLINE ITEM 157116

## (undated) DEVICE — SEE MEDLINE ITEM 157131

## (undated) DEVICE — STRAP OR TABLE 5IN X 72IN